# Patient Record
Sex: FEMALE | URBAN - NONMETROPOLITAN AREA
[De-identification: names, ages, dates, MRNs, and addresses within clinical notes are randomized per-mention and may not be internally consistent; named-entity substitution may affect disease eponyms.]

---

## 2021-04-21 ENCOUNTER — APPOINTMENT (RX ONLY)
Dept: URBAN - NONMETROPOLITAN AREA CLINIC 13 | Facility: CLINIC | Age: 66
Setting detail: DERMATOLOGY
End: 2021-04-21

## 2021-04-21 DIAGNOSIS — L57.8 OTHER SKIN CHANGES DUE TO CHRONIC EXPOSURE TO NONIONIZING RADIATION: ICD-10-CM

## 2021-04-21 DIAGNOSIS — D18.0 HEMANGIOMA: ICD-10-CM

## 2021-04-21 DIAGNOSIS — B35.1 TINEA UNGUIUM: ICD-10-CM

## 2021-04-21 DIAGNOSIS — B35.3 TINEA PEDIS: ICD-10-CM

## 2021-04-21 DIAGNOSIS — L82.1 OTHER SEBORRHEIC KERATOSIS: ICD-10-CM

## 2021-04-21 DIAGNOSIS — L40.0 PSORIASIS VULGARIS: ICD-10-CM

## 2021-04-21 DIAGNOSIS — D22 MELANOCYTIC NEVI: ICD-10-CM

## 2021-04-21 PROBLEM — D18.01 HEMANGIOMA OF SKIN AND SUBCUTANEOUS TISSUE: Status: ACTIVE | Noted: 2021-04-21

## 2021-04-21 PROBLEM — D22.5 MELANOCYTIC NEVI OF TRUNK: Status: ACTIVE | Noted: 2021-04-21

## 2021-04-21 PROBLEM — D23.71 OTHER BENIGN NEOPLASM OF SKIN OF RIGHT LOWER LIMB, INCLUDING HIP: Status: ACTIVE | Noted: 2021-04-21

## 2021-04-21 PROCEDURE — 99203 OFFICE O/P NEW LOW 30 MIN: CPT

## 2021-04-21 PROCEDURE — ? KOH PREP

## 2021-04-21 PROCEDURE — ? SUNSCREEN RECOMMENDATIONS

## 2021-04-21 PROCEDURE — ? PRESCRIPTION

## 2021-04-21 PROCEDURE — ? COUNSELING

## 2021-04-21 RX ORDER — CLOBETASOL PROPIONATE 0.5 MG/ML
1 SOLUTION TOPICAL QHS
Qty: 1 | Refills: 1 | Status: ERX | COMMUNITY
Start: 2021-04-21

## 2021-04-21 RX ADMIN — CLOBETASOL PROPIONATE 1: 0.5 SOLUTION TOPICAL at 00:00

## 2021-04-21 RX ADMIN — CICLOPIROX OLAMINE 1: 7.7 CREAM TOPICAL at 00:00

## 2021-04-21 RX ADMIN — CICLOPIROX 1: 80 SOLUTION TOPICAL at 00:00

## 2021-04-21 ASSESSMENT — LOCATION SIMPLE DESCRIPTION DERM
LOCATION SIMPLE: RIGHT GREAT TOE
LOCATION SIMPLE: LEFT FOOT
LOCATION SIMPLE: LEFT UPPER BACK
LOCATION SIMPLE: LEFT CHEEK
LOCATION SIMPLE: ABDOMEN
LOCATION SIMPLE: RIGHT UPPER BACK
LOCATION SIMPLE: RIGHT 5TH TOE
LOCATION SIMPLE: RIGHT 2ND TOE
LOCATION SIMPLE: SCALP

## 2021-04-21 ASSESSMENT — LOCATION ZONE DERM
LOCATION ZONE: TOENAIL
LOCATION ZONE: SCALP
LOCATION ZONE: FEET
LOCATION ZONE: FACE
LOCATION ZONE: TRUNK

## 2021-04-21 ASSESSMENT — LOCATION DETAILED DESCRIPTION DERM
LOCATION DETAILED: RIGHT MID-UPPER BACK
LOCATION DETAILED: LEFT RIB CAGE
LOCATION DETAILED: LEFT SUPERIOR LATERAL BUCCAL CHEEK
LOCATION DETAILED: LEFT LATERAL DORSAL FOOT
LOCATION DETAILED: RIGHT 5TH TOENAIL
LOCATION DETAILED: RIGHT GREAT TOENAIL
LOCATION DETAILED: LEFT SUPERIOR PARIETAL SCALP
LOCATION DETAILED: RIGHT 2ND TOENAIL
LOCATION DETAILED: LEFT MID-UPPER BACK

## 2021-04-21 NOTE — HPI: FULL BODY SKIN EXAMINATION
What Is The Reason For Today's Visit?: Full Body Skin Examination
What Is The Reason For Today's Visit? (Being Monitored For X): the development of new lesions
Additional History: This is a 65 year old female who was referred by Dr. Bertha Ruano for a routine full body skin exam.

## 2021-04-21 NOTE — PROCEDURE: COUNSELING
Detail Level: Generalized
Detail Level: Simple
Patient Specific Counseling (Will Not Stick From Patient To Patient): -Continue to follow up with Dr. Andrade Ruano for psoriatic arthritis, on Cimzia \\n-Add clobetasol 0.05% scalp solution AAA of the scalp nightly PRN for up to 2 weeks\\n-Recommended T-Gel shampoo alternating with T-Sal shampoo\\n-Follow up in 6 weeks
Detail Level: Detailed
Patient Specific Counseling (Will Not Stick From Patient To Patient): Ciclopirox 0.77% cream AAA of the feet (ankle down) BID for 4 weeks
Patient Specific Counseling (Will Not Stick From Patient To Patient): KOH positive\\nCiclopirox 8% solution AA nails daily

## 2021-04-21 NOTE — HPI: RASH (PSORIASIS)
Is This A New Presentation, Or A Follow-Up?: Psoriasis
Additional History: The patient is on Cimzia (she started this roughly in 1/2021) as prescribed by Dr. Bertha Ruano with significant improvement of her PsA symptoms and her plaque psoriasis symptoms.

## 2021-04-22 RX ORDER — CICLOPIROX OLAMINE 7.7 MG/G
1 CREAM TOPICAL BID
Qty: 1 | Refills: 0 | Status: ERX | COMMUNITY
Start: 2021-04-21

## 2021-04-22 RX ORDER — CICLOPIROX 80 MG/ML
1 SOLUTION TOPICAL QD
Qty: 1 | Refills: 11 | Status: ERX | COMMUNITY
Start: 2021-04-21